# Patient Record
Sex: FEMALE | Race: BLACK OR AFRICAN AMERICAN | URBAN - METROPOLITAN AREA
[De-identification: names, ages, dates, MRNs, and addresses within clinical notes are randomized per-mention and may not be internally consistent; named-entity substitution may affect disease eponyms.]

---
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
## 2022-03-18 PROBLEM — N94.6 DYSMENORRHEA IN ADOLESCENT: Status: ACTIVE | Noted: 2017-09-24

## 2024-04-10 ENCOUNTER — APPOINTMENT (OUTPATIENT)
Facility: HOSPITAL | Age: 24
End: 2024-04-10
Payer: MEDICAID

## 2024-04-10 ENCOUNTER — HOSPITAL ENCOUNTER (EMERGENCY)
Facility: HOSPITAL | Age: 24
Discharge: HOME | End: 2024-04-10
Payer: MEDICAID

## 2024-04-10 VITALS
DIASTOLIC BLOOD PRESSURE: 81 MMHG | RESPIRATION RATE: 18 BRPM | SYSTOLIC BLOOD PRESSURE: 112 MMHG | HEIGHT: 66 IN | WEIGHT: 134 LBS | TEMPERATURE: 98 F | HEART RATE: 97 BPM | OXYGEN SATURATION: 100 % | BODY MASS INDEX: 21.53 KG/M2

## 2024-04-10 DIAGNOSIS — M54.2 NECK PAIN: Primary | ICD-10-CM

## 2024-04-10 PROCEDURE — 99283 EMERGENCY DEPT VISIT LOW MDM: CPT

## 2024-04-10 PROCEDURE — 71046 X-RAY EXAM CHEST 2 VIEWS: CPT

## 2024-04-10 PROCEDURE — 70360 X-RAY EXAM OF NECK: CPT

## 2024-04-10 ASSESSMENT — ENCOUNTER SYMPTOMS
GASTROINTESTINAL NEGATIVE: 1
TROUBLE SWALLOWING: 0
VOICE CHANGE: 0
RESPIRATORY NEGATIVE: 1
FACIAL SWELLING: 0
ALLERGIC/IMMUNOLOGIC NEGATIVE: 1
EYES NEGATIVE: 1

## 2024-04-10 ASSESSMENT — PAIN - FUNCTIONAL ASSESSMENT: PAIN_FUNCTIONAL_ASSESSMENT: 0-10

## 2024-04-10 NOTE — ED PROVIDER NOTES
[facility] EMERGENCY DEPT  EMERGENCY DEPARTMENT ENCOUNTER      Pt Name: Loli Bonner  MRN: [id number]  Birthdate 2000  Date of evaluation: 4/10/2024  Provider: CIRA Love  8:10 PM    CHIEF COMPLAINT       Chief Complaint   Patient presents with    Neck Pain         HISTORY OF PRESENT ILLNESS    Loli Bonner is a 23 y.o. female who presents to the emergency department w/neck pain.  Pt is currently on amoxicillin for dental pain in lower left second molar.  For the past few days tho, she's had increasing pain in her inferior left neck above her clavicle.  Denies facial or neck swelling, recent trauma or rash, changes in voice, trouble swallowing, wheezing.  Denies possibility of pregnancy.    Says she didn't 'sleep wrong', doesn't have a 'sore throat'.     HPI    Nursing Notes were reviewed.    REVIEW OF SYSTEMS       Review of Systems   Constitutional: Negative.    HENT:  Negative for drooling, facial swelling, trouble swallowing and voice change.    Eyes: Negative.    Respiratory: Negative.     Cardiovascular: Negative.    Gastrointestinal: Negative.    Endocrine: Negative.    Genitourinary: Negative.    Musculoskeletal:  Positive for neck pain.   Skin:  Negative for rash and wound.   Allergic/Immunologic: Negative.    Neurological: Negative.    Hematological: Negative.    Psychiatric/Behavioral: Negative.         Except as noted above the remainder of the review of systems was reviewed and negative.       PAST MEDICAL HISTORY     Past Medical History:   Diagnosis Date    Asthma     hospitalized @ age 5, no intubations    Hx of seasonal allergies          SURGICAL HISTORY     No past surgical history on file.      CURRENT MEDICATIONS       Previous Medications    ALBUTEROL (PROVENTIL) (2.5 MG/3ML) 0.083% NEBULIZER SOLUTION    Inhale 2.5 mg into the lungs every 4 hours as needed    ALBUTEROL SULFATE HFA (PROVENTIL;VENTOLIN;PROAIR) 108 (90 BASE) MCG/ACT INHALER    Inhale 2 puffs into the lungs every 4 hours

## 2024-04-10 NOTE — ED TRIAGE NOTES
Pt in ED with c/o neck pain. Pt was seen at [facility] yesterday and was told the nect pain was related to her tooth pain but pt states the pain has gotten worse today. Pt was prescribed antibiotics. No injury/trauma noted